# Patient Record
(demographics unavailable — no encounter records)

---

## 2024-12-31 NOTE — PHYSICAL EXAM
[TextEntry] : GENERAL: Appears in no acute distress HEENT: EOMI, PERRLA. No conjunctival erythema. Moist mucous membranes. No nasopharyngeal ulcers NECK: Supple, no cervical lymphadenopathy, no thyromegaly CARDIOVASCULAR: RRR. S1, S2 auscultated. No murmurs or rubs. PULMONARY: Clear to auscultation b/l, no wheezes, rales, or crackles ABDOMINAL: Soft, nontender, nondistended. Bowel sounds present. No organomegaly. MSK: No active synovitis, swelling, erythema, or warmth. No joint tenderness to palpation. No deformities. Minimally prominent b/l nailfold capillaries. No digital tip ulcers. SKIN: No lesions or rashes. No skin thickening. NEURO: No focal deficits. PSYCH: AAOx3. Normal affect and thought process.

## 2024-12-31 NOTE — HISTORY OF PRESENT ILLNESS
[FreeTextEntry1] : HISTORY:  23 y/o female presents for re-establishment after diagnosis of ulcerative colitis.  Pt was initially seen by me in 2021. Pt reported Raynaud's of toes and sometimes fingers changes to white with numbness with cold weather and stress x 2 years. Returns to normal color in 5-10 mins. Never had digital tip ulcers.  Pt does not have any systemic symptoms.  Mother - hypothyroidism. No family Hx of autoimmune disease.  Labwork by me did not show any autoimmune markers including KEIRA, KEIRA subserologies, RA markers, Gracie-1, SCl-70, centromere Ab, SCl-70, cryoglobulins.  INTERVAL HISTORY:  Pt was having GI bleeding. Pt was diagnosed with colonoscopy showing mild ulcerative colitis in 8/2024. On the day of colonoscopy, in context of s/p prep for colonoscopy with laxatives, pt had joint swelling and pain of wrists, jaw. She went to ED and labwork showed high inflammatory markers. Pt was treated with IV steroids. Pt was started on mesalamine by GI in 12/2024, possibly mild improvement in GI bleeding so far. Pt has repeat colonoscopy pending in 7/2025 for follow up.  WORKUP:  Normal/neg (12/2021): CBC, CMP, ESR, CRP, KEIRA, dsDNA, SSA, SSB, Marquez, RNP, C3, C4, Thyroid Ab, RF, CCP, Gracie-1, SCl-70, centromere Ab, ANCAs, cryoglobulins, CPK, aldolase, TSH

## 2024-12-31 NOTE — ASSESSMENT
[FreeTextEntry1] : 23 y/o female presents for re-establishment after diagnosis of ulcerative colitis.  Pt was initially seen by me in 2021. Pt reported Raynaud's of toes and sometimes fingers changes to white with numbness with cold weather and stress x 2 years. Returns to normal color in 5-10 mins. Never had digital tip ulcers.  Pt does not have any systemic symptoms.  Mother - hypothyroidism. No family Hx of autoimmune disease.  Labwork by me did not show any autoimmune markers including KEIRA, KEIRA subserologies, RA markers, Gracie-1, SCl-70, centromere Ab, SCl-70, cryoglobulins.  Pt was having GI bleeding. Pt was diagnosed with colonoscopy showing mild ulcerative colitis in 8/2024. On the day of colonoscopy, in context of s/p prep for colonoscopy with laxatives, pt had joint swelling and pain of wrists, jaw. She went to ED and labwork showed high inflammatory markers. Pt was treated with IV steroids. Pt was started on mesalamine by GI in 12/2024, possibly mild improvement in GI bleeding so far. Pt has repeat colonoscopy pending in 7/2025 for follow up.  Pt likely had an episode of IBD-associated arthritis in setting of dehydration for colonoscopy prep. Because pt has not had any joint inflammation flares since then, any step up in therapy would likely be if her UC is not well controlled with mesalamine (in which case change in med would be done by GI) or if pt has recurrent inflammatory arthritis episodes (in which can I would discuss with pt about change in treatment). Discussed briefly about options for step up in therapy including SSZ, Humira, Remicade.  - c/w mesalamine with GI. If pt's repeat colonoscopy in 7/2025 shows incompletely controlled UC, GI will discuss step up in therapy. - If pt has any more episodes of inflammatory arthritis, pt to let me know and I will discuss step up in therapy. Pt will need Hep B/C, QTB in that case prior to starting DMARDs. - Advised to watch for symptoms of inflammatory arthritis (peripheral and axial), uveitis, psoriasis, etc. - Patient was counselled about Raynaud precautions, including dressing more warmly, avoidance of prolonged cold exposure, stress, and tobacco exposure.  - Pt to contact me for any episode of inflammatory arthritis. Otherwise, RTC 8/2025 after repeat colonoscopy and discussion with GI for follow up